# Patient Record
Sex: MALE
[De-identification: names, ages, dates, MRNs, and addresses within clinical notes are randomized per-mention and may not be internally consistent; named-entity substitution may affect disease eponyms.]

---

## 2017-07-07 ENCOUNTER — HOSPITAL ENCOUNTER (OUTPATIENT)
Dept: HOSPITAL 18 - NAVSJIPCSP | Age: 67
Discharge: HOME | End: 2017-07-07
Attending: INTERNAL MEDICINE
Payer: MEDICARE

## 2017-07-07 DIAGNOSIS — Z51.81: Primary | ICD-10-CM

## 2017-07-07 DIAGNOSIS — Z79.899: ICD-10-CM

## 2017-07-07 LAB
ALBUMIN SERPL BCG-MCNC: 4.3 G/DL (ref 3.4–4.8)
ALP SERPL-CCNC: 293 U/L (ref 40–150)
ALT SERPL W P-5'-P-CCNC: 35 U/L (ref 8–55)
ANION GAP SERPL CALC-SCNC: 17 MMOL/L (ref 10–20)
AST SERPL-CCNC: 38 U/L (ref 5–34)
BASOPHILS # BLD AUTO: 0 THOU/UL (ref 0–0.2)
BASOPHILS NFR BLD AUTO: 0.8 % (ref 0–1)
BILIRUB SERPL-MCNC: 0.9 MG/DL (ref 0.2–1.2)
BUN SERPL-MCNC: 19 MG/DL (ref 8.4–25.7)
CALCIUM SERPL-MCNC: 9.6 MG/DL (ref 7.8–10.44)
CHD RISK SERPL-RTO: 3.3 (ref ?–4.5)
CHLORIDE SERPL-SCNC: 100 MMOL/L (ref 98–107)
CHOLEST SERPL-MCNC: 156 MG/DL
CO2 SERPL-SCNC: 24 MMOL/L (ref 23–31)
CREAT CL PREDICTED SERPL C-G-VRATE: 0 ML/MIN (ref 70–130)
EOSINOPHIL # BLD AUTO: 0.2 THOU/UL (ref 0–0.7)
EOSINOPHIL NFR BLD AUTO: 3.5 % (ref 0–10)
GLOBULIN SER CALC-MCNC: 4.1 G/DL (ref 2.4–3.5)
GLUCOSE SERPL-MCNC: 111 MG/DL (ref 80–115)
HDLC SERPL-MCNC: 48 MG/DL
HGB BLD-MCNC: 15.1 G/DL (ref 14–18)
LDLC SERPL CALC-MCNC: 83 MG/DL
LYMPHOCYTES # BLD AUTO: 1.4 THOU/UL (ref 1.2–3.4)
LYMPHOCYTES NFR BLD AUTO: 24.9 % (ref 21–51)
MCH RBC QN AUTO: 27.5 PG (ref 27–31)
MCV RBC AUTO: 84.2 FL (ref 80–94)
MONOCYTES # BLD AUTO: 0.5 THOU/UL (ref 0.11–0.59)
MONOCYTES NFR BLD AUTO: 8.9 % (ref 0–10)
NEUTROPHILS # BLD AUTO: 3.6 THOU/UL (ref 1.4–6.5)
NEUTROPHILS NFR BLD AUTO: 61.9 % (ref 42–75)
PLATELET # BLD AUTO: 177 THOU/UL (ref 130–400)
POTASSIUM SERPL-SCNC: 3.9 MMOL/L (ref 3.5–5.1)
RBC # BLD AUTO: 5.51 MILL/UL (ref 4.7–6.1)
SODIUM SERPL-SCNC: 137 MMOL/L (ref 136–145)
TRIGL SERPL-MCNC: 126 MG/DL (ref ?–150)
WBC # BLD AUTO: 5.8 THOU/UL (ref 4.8–10.8)

## 2017-07-07 PROCEDURE — 84443 ASSAY THYROID STIM HORMONE: CPT

## 2017-07-07 PROCEDURE — 83036 HEMOGLOBIN GLYCOSYLATED A1C: CPT

## 2017-07-07 PROCEDURE — 36415 COLL VENOUS BLD VENIPUNCTURE: CPT

## 2017-07-07 PROCEDURE — 85025 COMPLETE CBC W/AUTO DIFF WBC: CPT

## 2017-07-07 PROCEDURE — 80061 LIPID PANEL: CPT

## 2017-07-07 PROCEDURE — 80053 COMPREHEN METABOLIC PANEL: CPT

## 2018-06-14 ENCOUNTER — HOSPITAL ENCOUNTER (OUTPATIENT)
Dept: HOSPITAL 18 - NAV ULT | Age: 68
Discharge: HOME | End: 2018-06-14
Attending: INTERNAL MEDICINE
Payer: MEDICARE

## 2018-06-14 DIAGNOSIS — K76.0: ICD-10-CM

## 2018-06-14 DIAGNOSIS — Z90.49: ICD-10-CM

## 2018-06-14 DIAGNOSIS — K76.9: Primary | ICD-10-CM

## 2018-06-14 PROCEDURE — 76705 ECHO EXAM OF ABDOMEN: CPT

## 2018-06-14 NOTE — ULT
SONOGRAM RIGHT UPPER QUADRANT:

 

History 

Liver disease.  Cirrhosis.

 

FINDINGS: 

Gallbladder is surgically absent.  Common duct 0.8 cm.  The liver is diffusely echogenic without foca
l mass or intrahepatic biliary dilatation.  No free fluid.

 

IMPRESSION: 

1.  Status post cholecystectomy.  No evidence of biliary obstruction.

 

2.  Hepatosteatosis.

 

POS: [facility]

## 2020-07-09 ENCOUNTER — HOSPITAL ENCOUNTER (OUTPATIENT)
Dept: HOSPITAL 18 - NAV CT | Age: 70
Discharge: HOME | End: 2020-07-09
Attending: INTERNAL MEDICINE
Payer: MEDICARE

## 2020-07-09 DIAGNOSIS — R18.8: ICD-10-CM

## 2020-07-09 DIAGNOSIS — E11.22: Primary | ICD-10-CM

## 2020-07-09 DIAGNOSIS — N18.3: ICD-10-CM

## 2020-07-09 DIAGNOSIS — K74.60: ICD-10-CM

## 2020-07-09 DIAGNOSIS — R63.4: ICD-10-CM

## 2020-07-09 DIAGNOSIS — R16.1: ICD-10-CM

## 2020-07-09 DIAGNOSIS — K76.6: ICD-10-CM

## 2020-07-09 PROCEDURE — 74176 CT ABD & PELVIS W/O CONTRAST: CPT

## 2020-07-09 NOTE — CT
CT abdomen and pelvis:

7/19/2020



COMPARISON: 3/15/2020



HISTORY: Weight loss



TECHNIQUE: Axial CT imaging at 5 mm intervals through the abdomen and pelvis without contrast. Corona
l and sagittal reformatted imaging obtained.



FINDINGS: The lack of contrast media limits assessment of the viscera, bowel, vascular structures, an
d for lymphadenopathy.



Trace pleural fluid noted on the right, improved when compared to prior imaging. Previously noted lef
t pleural effusion has resolved. Partially imaged cardiac enlargement.



No free intraperitoneal air. There is small volume free fluid in the pelvis, new when compared to the
 prior examination.



Limited assessment of the liver demonstrates diffuse heterogeneity and coarse peripheral contour cons
istent with cirrhosis. Cholecystectomy clips are present. The spleen is enlarged, measuring 16.8

cm. Trace perisplenic free fluid noted, less conspicuous than on the prior examination.



Limited assessment of the pancreas, adrenal glands, and kidneys unremarkable.



There is scattered atherosclerotic calcification of the abdominal aorta and its branches.



Limited assessment of the bowel demonstrates sigmoid diverticulosis. No evidence for bowel inflammato
ry change or obstruction. No acute osseous abnormality. Multilevel lower lumbar spine facet

hypertrophic change.



IMPRESSION: Cirrhosis with evidence of portal hypertension given splenomegaly and small volume ascite
s.



Reported By: Ramses Lockwood 

Electronically Signed:  7/9/2020 8:44 AM